# Patient Record
Sex: MALE | Race: BLACK OR AFRICAN AMERICAN
[De-identification: names, ages, dates, MRNs, and addresses within clinical notes are randomized per-mention and may not be internally consistent; named-entity substitution may affect disease eponyms.]

---

## 2017-10-19 NOTE — EMERGENCY ROOM REPORT
History of Present Illness


General


Chief Complaint:  Pain


Source:  Patient





Present Illness


HPI


37-year-old male complaining of right finger pain for one week.  Patient states 

he was playing football caught the ball injured his finger does not know how 

unknown has since had pain to right index finger.  Worse with movement.  No 

other injuries


Allergies:  


Coded Allergies:  


     No Known Allergies (Unverified , 10/19/17)





Patient History


Past Medical History:  see triage record


Past Surgical History:  none


Pertinent Family History:  none


Reviewed Nursing Documentation:  PMH: Agreed, PSxH: Agreed





Nursing Documentation-PMH


Past Medical History:  No Stated History





Review of Systems


All Other Systems:  negative except mentioned in HPI





Physical Exam





Vital Signs








  Date Time  Temp Pulse Resp B/P (MAP) Pulse Ox O2 Delivery O2 Flow Rate FiO2


 


10/19/17 11:08 97.9 50 16 123/80 100 Room Air  








Sp02 EP Interpretation:  reviewed, normal


General Appearance:  normal inspection, well appearing, no apparent distress, 

alert, GCS 15, non-toxic


Head:  normocephalic, atraumatic


Eyes:  bilateral eye normal inspection, bilateral eye PERRL, bilateral eye EOMI


ENT:  normal ENT inspection, normal pharynx, normal voice, moist mucus membranes


Neck:  normal inspection, full range of motion, supple


Respiratory:  normal inspection, lungs clear, normal breath sounds, no 

respiratory distress, no retraction, no wheezing, speaking full sentences, 

chest symmetrical


Cardiovascular #1:  normal inspection, regular rate, rhythm, no edema, normal 

capillary refill


Cardiovascular #2:  2+ radial (R), 2+ radial (L)


Gastrointestinal:  normal inspection, non tender, soft, non-distended, no 

guarding


Genitourinary:  no CVA tenderness


Musculoskeletal:  back normal, other - Right index finger, swelling noted 

distal phalanx, around PIP, tender to palpation, FDS and FDP intact, no other 

abnormalities


Neurologic:  normal inspection, alert, oriented x3, responsive, motor strength/

tone normal, sensory intact, normal gait, speech normal


Psychiatric:  normal inspection, judgement/insight normal, memory normal


Skin:  normal inspection, normal color, no rash, warm/dry, well hydrated, 

normal turgor





Medical Decision Making


Diagnostic Impression:  


 Primary Impression:  


 Phalanx, distal fracture of finger


ER Course


37-year-old male with right finger pain





DDX: Contusion vs. fracture





Plan: 


XR





ER course:


Distal phalanx fracture


Finger splint applied








Disposition:


Patient instructed to keep splint on at all times, and to follow up with 

orthopedic surgery in 1 week. 


Patient educated to rest, ice, and elevate extremity and to avoid vigorous 

activity.


Strict precautions discussed with patient on when to return to the emergency 

room including increased redness or swelling joints, increased pain/swelling of 

extremity, fever or chills, which could indicate severe illness.


Patient is to follow up with their primary care doctor within 5 days. 


Patient also instructed to follow up with an orthopedic doctor if continuing to 

have mild/moderate pain as he may need further outpatient imaging. Patient 

agrees with plan.





Please note that this Emergency Department Report was dictated using [employer] technology software, occasionally this can lead to 

erroneous entry secondary to interpretation by the dictation equipment.








Xray ordered: Right hand


3 view


Indication: Pain


EP Interpretation: Yes


Interpretation:  right second digit distal phalanx fracture


Impression: Right second digit distal phalanx fracture


Electronically signed by Timi Garcia MD





Last Vital Signs








  Date Time  Temp Pulse Resp B/P (MAP) Pulse Ox O2 Delivery O2 Flow Rate FiO2


 


10/19/17 11:59 97.9 55 16 123/80 100 Room Air  








Disposition:  HOME, SELF-CARE


Condition:  Stable


Patient Instructions:  Finger Fracture, Easy-to-Read





Additional Instructions:  


Please followup with orthopedics in one week











Timi Garcia M.D. Oct 19, 2017 12:56

## 2017-10-19 NOTE — DIAGNOSTIC IMAGING REPORT
Indication: pain



Findings: 3 views of the right hand were obtained.



Normal bony mineralization and alignment are demonstrated. No acute fractures,

erosions, or periosteal reaction are seen. Soft tissues are unremarkable.



Impression: No acute findings.

## 2019-09-13 ENCOUNTER — HOSPITAL ENCOUNTER (EMERGENCY)
Dept: HOSPITAL 72 - EMR | Age: 39
Discharge: HOME | End: 2019-09-13
Payer: SELF-PAY

## 2019-09-13 VITALS — DIASTOLIC BLOOD PRESSURE: 62 MMHG | SYSTOLIC BLOOD PRESSURE: 112 MMHG

## 2019-09-13 VITALS — BODY MASS INDEX: 21.87 KG/M2 | HEIGHT: 73 IN | WEIGHT: 165 LBS

## 2019-09-13 DIAGNOSIS — S20.212A: Primary | ICD-10-CM
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
## 2019-09-13 DIAGNOSIS — Z85.72: ICD-10-CM
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
## 2019-09-13 PROCEDURE — 99283 EMERGENCY DEPT VISIT LOW MDM: CPT

## 2019-09-13 NOTE — NUR
ED Nurse Note:





per Dr. Rothman's order, patient instructed to use incentive spirometer, 
provided 1 incentive spirometer. patient verbalized understanding and 
demonstrated proper use.

## 2019-09-13 NOTE — NUR
ED Nurse Note:

Patient walked into ED from home c/o left rib pain after punched by elbow while 
playing basketball last week. patient is alert awake x4 ambulatory steady gait, 
breathing unlabored and even, talking in full sentences.

## 2019-09-13 NOTE — DIAGNOSTIC IMAGING REPORT
Indication: Left-sided chest pain after trauma playing basketball

 

Technique: 2 views of the left ribs, one view of the chest

 

Comparison: none

 

Findings: The lungs and pleural spaces are clear. No evidence of pneumothorax. No rib

fracture demonstrated. The heart size is normal

 

Impression: Negative

## 2019-09-13 NOTE — NUR
ER DISCHARGE NOTE:

Patient is cleared to be discharged per ERMD DR COVARRUBIAS, pt is aox4, on room 
air, with stable vital signs. pt was given dc and prescription instructions, pt 
was able to verbalize understanding, pt id band removed without complications. 
pt is able to ambulate with steady gait. pt took all belongings.

## 2019-09-13 NOTE — EMERGENCY ROOM REPORT
History of Present Illness


General


Chief Complaint:  Pain


Source:  Patient





Present Illness


HPI


38-year-old male presents with left chest wall pain started 1 week ago after 

playing basketball he was elbowed while he was making a lay up, he denies any 

shortness of breath, he endorses chest wall ache worsened with movement 

alleviated with rest, severity is moderate, symptoms are intermittent, he does 

endorse deep breathing makes the pain worse no nausea no vomiting no abdominal 

pain patient presents for evaluation


Allergies:  


Coded Allergies:  


     No Known Allergies (Unverified , 10/19/17)





Patient History


Past Medical History:  see triage record


Reviewed Nursing Documentation:  PMH: Agreed; PSxH: Agreed





Nursing Documentation-PMH


Past Medical History:  No History, Except For


Hx Cancer:  Yes - lung lymphoma 2003





Review of Systems


All Other Systems:  negative except mentioned in HPI





Physical Exam





Vital Signs








  Date Time  Temp Pulse Resp B/P (MAP) Pulse Ox O2 Delivery O2 Flow Rate FiO2


 


9/13/19 09:05 98.8 50 18 110/59 (76) 99 Room Air  








Sp02 EP Interpretation:  reviewed, normal


General Appearance:  well appearing, no apparent distress, alert


Head:  normocephalic, atraumatic


Eyes:  bilateral eye PERRL, bilateral eye EOMI


ENT:  uvula midline, moist mucus membranes


Neck:  supple, thyroid normal, supple/symm/no masses


Respiratory:  lungs clear, no respiratory distress, no retraction, no accessory 

muscle use


Cardiovascular #1:  normal peripheral pulses, regular rate, rhythm, no edema, 

no gallop, no murmur, other - Tenderness to palpation left chest wall 5/6 

lateral ribs


Gastrointestinal:  non tender, soft, no guarding, no rebound


Musculoskeletal:  normal inspection


Neurologic:  alert, oriented x3


Psychiatric:  mood/affect normal


Skin:  no rash, warm/dry





Medical Decision Making


Diagnostic Impression:  


 Primary Impression:  


 Contusion of left chest wall


 Qualified Codes:  S20.212A - Contusion of left front wall of thorax, initial 

encounter


ER Course


Patient presents with chest wall pain most likely contusion versus rib fracture 

versus ACS


Patient given pain meds, incentive spirometry disposition home with return 

precautions x-ray shows no acute fracture


Patient most likely with chest wall contusion


Other X-Ray Diagnostic Results


Other X-Ray Diagnostic Results :  


   X-Ray ordered:  PA chest, left rib series


   # of Views/Limited Vs Complete:  Complete


   Indication:  Pain


   EP Interpretation:  Yes


   Interpretation:  no fractures


   Impression:  No acute disease


   Electronically Signed by:  Selvin Rothman MD





Last Vital Signs








  Date Time  Temp Pulse Resp B/P (MAP) Pulse Ox O2 Delivery O2 Flow Rate FiO2


 


9/13/19 09:19 98.8 56 19 112/62 100 Room Air  








Disposition:  HOME, SELF-CARE


Condition:  Stable


Scripts


Lidocaine Patch* (Lidoderm Patch*) 1 Each Adh..patch


1 PATCH TOPIC DAILY, #7 PATCH 0 Refills


   Patch(es) may remain in place for up to 12 hours in any 24-hour


   period.


   Prov: Selvin Rothman MD         9/13/19 


Methocarbamol* (ROBAXIN-750*) 750 Mg Tablet


750 MG PO QID, #28 TAB 0 Refills


   Prov: Selvin Rothman MD         9/13/19 


Naproxen* (NAPROSYN*) 250 Mg Tablet


250 MG ORAL BID PRN for For Pain, #20 TAB 0 Refills


   Prov: Selvin Rothman MD         9/13/19


Referrals:  


[facility]. [facility]


Patient Instructions:  Chest Contusion, Easy-to-Read, Chest Wall Pain, Easy-to-

Read





Additional Instructions:  


The patient was provided with discharge instructions, notified to follow-up 

with a primary care doctor and or specialist in the next 24-48 hours, and to 

return to the ED if they have worsening of their symptoms. 





Please note that this report is being documented using [employer] technology.


This can lead to erroneous entry secondary to incorrect interpretation by the 

dictating instrument.











Selvin Rothman MD Sep 13, 2019 09:35

## 2019-10-28 ENCOUNTER — HOSPITAL ENCOUNTER (EMERGENCY)
Dept: HOSPITAL 72 - EMR | Age: 39
Discharge: HOME | End: 2019-10-28
Payer: SELF-PAY

## 2019-10-28 VITALS — HEIGHT: 72 IN | BODY MASS INDEX: 23.03 KG/M2 | WEIGHT: 170 LBS

## 2019-10-28 VITALS — DIASTOLIC BLOOD PRESSURE: 75 MMHG | SYSTOLIC BLOOD PRESSURE: 115 MMHG

## 2019-10-28 DIAGNOSIS — S60.212A: Primary | ICD-10-CM
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
## 2019-10-28 PROCEDURE — 29125 APPL SHORT ARM SPLINT STATIC: CPT

## 2019-10-28 PROCEDURE — 99283 EMERGENCY DEPT VISIT LOW MDM: CPT

## 2019-10-28 NOTE — EMERGENCY ROOM REPORT
History of Present Illness


General


Chief Complaint:  Upper Extremity Injury


Source:  Patient





Present Illness


HPI


Patient presents with a left hand injury sustained playing flag football 

yesterday.  He was tackled and hit his left wrist.  There is been more swelling 

and increased pain through the night.  He feels some numbness over the dorsum 

of his hand.  He is able to make a fist but there is weakness in the hand.  

Iced last night.  Pain rated 8/10, aching radiating somewhat into the forearm.  

No elbow or shoulder pain.  No other trauma sustained.





R handed


Allergies:  


Coded Allergies:  


     No Known Allergies (Unverified , 10/19/17)





Patient History


Past Medical History:  see triage record


Social History:  Denies: smoking


Social History Narrative


, not working


Reviewed Nursing Documentation:  PMH: Agreed; PSxH: Agreed





Nursing Documentation-PMH


Past Medical History:  No History, Except For


Hx Cancer:  Yes - lung lymphoma 2003





Review of Systems


Constitutional:  Denies: fever


Cardiovascular:  Denies: chest pain


Gastrointestinal:  Denies: abdominal pain


Musculoskeletal:  Reports: see HPI


Skin:  Reports: see HPI


Neurological:  Reports: see HPI


Hematologic/Lymphatic:  Reports: see HPI





Physical Exam





Vital Signs








  Date Time  Temp Pulse Resp B/P (MAP) Pulse Ox O2 Delivery O2 Flow Rate FiO2


 


10/28/19 09:10 98.1 52 18 115/75 (88) 100 Room Air  








Sp02 EP Interpretation:  reviewed, normal


General Appearance:  well appearing, no apparent distress, GCS 15


Head:  normocephalic


Eyes:  bilateral eye normal inspection, bilateral eye PERRL


ENT:  moist mucus membranes


Neck:  full range of motion


Respiratory:  normal inspection


Cardiovascular #1:  regular rate, rhythm


Cardiovascular #2:  2+ radial (L) - Capillary refill normal


Gastrointestinal:  normal inspection


Musculoskeletal:  gait/station normal, tenderness - Left wrist dorsum of hand 

first and second metacarpals no stuff box tenderness


Neurologic:  alert, oriented x3, distal neuro normal


Psychiatric:  mood/affect normal


Skin:  normal color, other - Hematoma dorsum of hand





Medical Decision Making


Diagnostic Impression:  


 Primary Impression:  


 Contusion of left wrist


 Qualified Codes:  S60.212A - Contusion of left wrist, initial encounter


ER Course


Patient presents with left wrist injury.  Differential includes contusion, 

sprain versus fracture.  The snuffbox is not tender and therefore navicular is 

less suspicious at the moment.  He has more pain over the medic carpals index 

and middle.  Tendons are intact.  X-rays are indicated.  Ice and Motrin are 

given.





X-ray without fracture.





Splint and sling applied by tech.  Position excellent with decreased pain.  

Distal neurovascular checked by me and normal.





Discussed treatment plan with patient.  In addition advised that there could be 

a hairline fracture which may show up later.  Advised the patient to follow-up.





Patient stable for outpatient observation and treatment.


Other X-Ray Diagnostic Results


Other X-Ray Diagnostic Results :  


   X-Ray ordered:  Left wrist


   # of Views/Limited Vs Complete:  3 View


   Indication:  Other


   EP Interpretation:  Yes


   Interpretation:  no dislocation, no soft tissue swelling, no fractures


   Impression:  No acute disease


   Electronically Signed by:  Electronically signed by Bonifacio Sparks MD





Last Vital Signs








  Date Time  Temp Pulse Resp B/P (MAP) Pulse Ox O2 Delivery O2 Flow Rate FiO2


 


10/28/19 10:55 98.1 68 18 115/75 100 Room Air  








Status:  improved


Disposition:  HOME, SELF-CARE


Condition:  Improved


Scripts


Ibuprofen* (MOTRIN*) 600 Mg Tablet


600 MG ORAL Q6H PRN for For Pain, #20 TAB 0 Refills


   Prov: Bonifacio Sparks MD         10/28/19











Bonifacio Sparks MD Oct 28, 2019 09:23

## 2019-10-28 NOTE — NUR
ER DISCHARGE NOTE:wrist splint with sling was placed on left arm

Patient is cleared to be discharged per ERMD, pt is aox4, on room air, with 
stable vital signs. pt was given dc and prescription instructions, pt was able 
to verbalize understanding, pt is able to ambulate with steady gait. pt took 
all belongings.

## 2019-10-28 NOTE — NUR
ED Nurse Note:pt. came with sports related left wrist injury and pain, skin is 
intact and light swelling noted in the wrist area